# Patient Record
Sex: FEMALE | Race: WHITE | NOT HISPANIC OR LATINO | Employment: FULL TIME | URBAN - METROPOLITAN AREA
[De-identification: names, ages, dates, MRNs, and addresses within clinical notes are randomized per-mention and may not be internally consistent; named-entity substitution may affect disease eponyms.]

---

## 2019-06-27 ENCOUNTER — OFFICE VISIT (OUTPATIENT)
Dept: OBGYN CLINIC | Facility: CLINIC | Age: 54
End: 2019-06-27
Payer: COMMERCIAL

## 2019-06-27 VITALS
BODY MASS INDEX: 25.24 KG/M2 | SYSTOLIC BLOOD PRESSURE: 136 MMHG | WEIGHT: 117 LBS | HEIGHT: 57 IN | DIASTOLIC BLOOD PRESSURE: 70 MMHG

## 2019-06-27 DIAGNOSIS — Z01.419 ENCOUNTER FOR ANNUAL ROUTINE GYNECOLOGICAL EXAMINATION: ICD-10-CM

## 2019-06-27 DIAGNOSIS — Z11.51 SCREENING FOR HPV (HUMAN PAPILLOMAVIRUS): ICD-10-CM

## 2019-06-27 DIAGNOSIS — Z12.4 CERVICAL CANCER SCREENING: Primary | ICD-10-CM

## 2019-06-27 DIAGNOSIS — Z12.31 ENCOUNTER FOR SCREENING MAMMOGRAM FOR BREAST CANCER: ICD-10-CM

## 2019-06-27 PROBLEM — Z51.81 THERAPEUTIC DRUG MONITORING: Status: ACTIVE | Noted: 2017-01-02

## 2019-06-27 PROBLEM — M79.2 NEUROPATHIC PAIN: Status: ACTIVE | Noted: 2019-04-09

## 2019-06-27 PROBLEM — R25.1 TREMOR: Status: ACTIVE | Noted: 2019-04-09

## 2019-06-27 PROBLEM — R29.898 LEG WEAKNESS: Status: ACTIVE | Noted: 2017-11-18

## 2019-06-27 PROBLEM — G56.03 BILATERAL CARPAL TUNNEL SYNDROME: Status: ACTIVE | Noted: 2019-04-09

## 2019-06-27 PROCEDURE — S0612 ANNUAL GYNECOLOGICAL EXAMINA: HCPCS | Performed by: OBSTETRICS & GYNECOLOGY

## 2019-06-27 PROCEDURE — 87624 HPV HI-RISK TYP POOLED RSLT: CPT | Performed by: OBSTETRICS & GYNECOLOGY

## 2019-06-27 PROCEDURE — G0145 SCR C/V CYTO,THINLAYER,RESCR: HCPCS | Performed by: OBSTETRICS & GYNECOLOGY

## 2019-06-27 RX ORDER — AMANTADINE HYDROCHLORIDE 100 MG/1
CAPSULE, GELATIN COATED ORAL
COMMUNITY
Start: 2019-01-09

## 2019-06-27 RX ORDER — LISINOPRIL 10 MG/1
TABLET ORAL
COMMUNITY
Start: 2019-06-18

## 2019-06-27 RX ORDER — IBUPROFEN 200 MG
200 TABLET ORAL
COMMUNITY
Start: 2009-11-30

## 2019-06-27 RX ORDER — BACLOFEN 20 MG/1
20 TABLET ORAL DAILY
COMMUNITY
Start: 2019-04-09 | End: 2019-07-08

## 2019-06-27 RX ORDER — GABAPENTIN 600 MG/1
TABLET ORAL
COMMUNITY
Start: 2019-04-23

## 2019-06-27 RX ORDER — ASPIRIN 325 MG
325 TABLET ORAL DAILY
COMMUNITY
Start: 2010-12-10

## 2019-06-27 RX ORDER — PEGINTERFERON BETA-1A 125 UG/.5ML
INJECTION, SOLUTION SUBCUTANEOUS
COMMUNITY
Start: 2019-04-29

## 2019-07-02 LAB
HPV HR 12 DNA CVX QL NAA+PROBE: NEGATIVE
HPV16 DNA CVX QL NAA+PROBE: NEGATIVE
HPV18 DNA CVX QL NAA+PROBE: NEGATIVE

## 2019-07-05 LAB
LAB AP GYN PRIMARY INTERPRETATION: NORMAL
Lab: NORMAL

## 2019-07-08 ENCOUNTER — TELEPHONE (OUTPATIENT)
Dept: OBGYN CLINIC | Facility: CLINIC | Age: 54
End: 2019-07-08

## 2019-07-17 ENCOUNTER — HOSPITAL ENCOUNTER (OUTPATIENT)
Dept: RADIOLOGY | Age: 54
Discharge: HOME | End: 2019-07-17
Payer: COMMERCIAL

## 2019-07-17 VITALS — HEIGHT: 57 IN | BODY MASS INDEX: 24.81 KG/M2 | WEIGHT: 115 LBS

## 2019-07-17 DIAGNOSIS — Z12.31 ENCOUNTER FOR SCREENING MAMMOGRAM FOR BREAST CANCER: ICD-10-CM

## 2019-07-17 PROCEDURE — 77067 SCR MAMMO BI INCL CAD: CPT

## 2019-07-17 PROCEDURE — 77063 BREAST TOMOSYNTHESIS BI: CPT

## 2019-07-22 ENCOUNTER — TELEPHONE (OUTPATIENT)
Dept: OBGYN CLINIC | Facility: CLINIC | Age: 54
End: 2019-07-22

## 2019-07-22 NOTE — TELEPHONE ENCOUNTER
----- Message from [employer], DO sent at 7/18/2019 10:54 PM EDT -----  She is a candidate for breast MRI because she has dense breast tissue and an elevated risk    Sorry for the confusion

## 2020-07-20 ENCOUNTER — TELEPHONE (OUTPATIENT)
Dept: OBGYN CLINIC | Facility: CLINIC | Age: 55
End: 2020-07-20

## 2020-07-22 ENCOUNTER — HOSPITAL ENCOUNTER (OUTPATIENT)
Dept: RADIOLOGY | Age: 55
Discharge: HOME | End: 2020-07-22
Payer: COMMERCIAL

## 2020-07-22 VITALS — HEIGHT: 57 IN | WEIGHT: 114 LBS | BODY MASS INDEX: 24.59 KG/M2

## 2020-07-22 DIAGNOSIS — Z12.31 ENCOUNTER FOR SCREENING MAMMOGRAM FOR MALIGNANT NEOPLASM OF BREAST: ICD-10-CM

## 2020-07-22 PROCEDURE — 77063 BREAST TOMOSYNTHESIS BI: CPT

## 2020-07-22 PROCEDURE — 77067 SCR MAMMO BI INCL CAD: CPT

## 2020-09-24 ENCOUNTER — ANNUAL EXAM (OUTPATIENT)
Dept: OBGYN CLINIC | Facility: CLINIC | Age: 55
End: 2020-09-24
Payer: COMMERCIAL

## 2020-09-24 VITALS
DIASTOLIC BLOOD PRESSURE: 82 MMHG | TEMPERATURE: 97.6 F | HEIGHT: 56 IN | SYSTOLIC BLOOD PRESSURE: 126 MMHG | WEIGHT: 115.4 LBS | BODY MASS INDEX: 25.96 KG/M2

## 2020-09-24 DIAGNOSIS — Z01.419 ENCOUNTER FOR ANNUAL ROUTINE GYNECOLOGICAL EXAMINATION: Primary | ICD-10-CM

## 2020-09-24 DIAGNOSIS — Z12.31 ENCOUNTER FOR SCREENING MAMMOGRAM FOR BREAST CANCER: ICD-10-CM

## 2020-09-24 PROCEDURE — S0612 ANNUAL GYNECOLOGICAL EXAMINA: HCPCS | Performed by: OBSTETRICS & GYNECOLOGY

## 2020-09-24 NOTE — PROGRESS NOTES
Assessment/Plan:    pap is up to date    mammogram reviewed with her including breast density  RX given for next July   Discussed self breast exams    colon cancer screening - colonoscopy 2013, due in 2023    Discussed conservative measures for hot flashes  discussed preventive care, regular exercise and a healthy diet      No problem-specific Assessment & Plan notes found for this encounter  Diagnoses and all orders for this visit:    Encounter for annual routine gynecological examination    Encounter for screening mammogram for breast cancer  -     Mammo screening bilateral w 3d & cad; Future          Subjective:      Patient ID: Carol Bautista is a 60 y o  female  Pt here for yearly  She is having mild hot flashes  Delayed urinary emptying, possibly related to her MS, she is seeing her neurologist about this  Normal pap and negative HPV in 2019  Normal 3D mammogram in July of this year with scattered fibroglandular densities  The following portions of the patient's history were reviewed and updated as appropriate: allergies, current medications, past family history, past medical history, past social history, past surgical history and problem list     Review of Systems   Constitutional: Negative  Gastrointestinal: Negative  Endocrine: Positive for heat intolerance  Genitourinary: Negative  Objective:      /82 (BP Location: Left arm, Patient Position: Sitting, Cuff Size: Adult)   Temp 97 6 °F (36 4 °C)   Ht 4' 8" (1 422 m)   Wt 52 3 kg (115 lb 6 4 oz)   BMI 25 87 kg/m²          Physical Exam  Vitals signs reviewed  Constitutional:       Appearance: She is well-developed  Neck:      Thyroid: No thyromegaly  Trachea: No tracheal deviation  Cardiovascular:      Rate and Rhythm: Normal rate and regular rhythm  Pulmonary:      Effort: Pulmonary effort is normal       Breath sounds: Normal breath sounds     Chest:      Breasts: Breasts are symmetrical  Right: No inverted nipple, mass, nipple discharge, skin change or tenderness  Left: No inverted nipple, mass, nipple discharge, skin change or tenderness  Abdominal:      General: There is no distension  Palpations: Abdomen is soft  There is no mass  Tenderness: There is no abdominal tenderness  Genitourinary:     Labia:         Right: No rash, tenderness, lesion or injury  Left: No rash, tenderness, lesion or injury  Vagina: Normal       Cervix: No cervical motion tenderness, discharge or friability  Adnexa:         Right: No mass, tenderness or fullness  Left: No mass, tenderness or fullness          Rectum: Normal

## 2020-09-24 NOTE — PROGRESS NOTES
Mammogram - done in July 2020, some fibroglandular densities but was negative for any malignancies  Colonoscopy - up to date, last done in 2013 and will return in 2023  Pap - done in 2019  Patient is low risk  Complaints:   -mild post menopausal hot flashes- do not disrupt her normal daily living and patient's sleep is only mildly affected  -Delayed urinary emptying - started approximately 2-3 years ago  Feels need to urinate, has to sit for awhile to allow bladder to empty and then needs to repeat soon after  Has discussed this with her neuorologist who ruled out UTI - stated it could be related to her MS, next step if condition worsens is to self straight cath  Patient reports her delayed urinary emptying has not worsened at all since it has begun

## 2021-01-25 ENCOUNTER — NURSE TRIAGE (OUTPATIENT)
Dept: OTHER | Facility: OTHER | Age: 56
End: 2021-01-25

## 2021-01-25 DIAGNOSIS — Z20.828 SARS-ASSOCIATED CORONAVIRUS EXPOSURE: Primary | ICD-10-CM

## 2021-01-25 NOTE — TELEPHONE ENCOUNTER
Regarding: Covid Exposure  ----- Message from Peri Fletcher sent at 1/25/2021 12:40 PM EST -----  covid exposure from mother and works at [employer]

## 2021-01-25 NOTE — TELEPHONE ENCOUNTER
1  Were you within 6 feet or less, for up to 15 minutes or more with a person that has a confirmed COVID-19 test?   Mother tested positive  Patient assisted her for about 1 5 hours  2  What was the date of your exposure? 1/22 & 23/2021  3  Are you experiencing any symptoms attributed to the virus?  (Assess for SOB, cough, fever, difficulty breathing)    Asymptomatic  4  HIGH RISK: Do you have any history heart or lung conditions, weakened immune system, diabetes, Asthma, CHF, HIV, COPD, Chemo, renal failure, sickle cell, etc?   Multiple Sclerosis and takes Interferon  5  PREGNANCY: Are you pregnant or did you recently give birth?    N/A

## 2021-01-28 DIAGNOSIS — Z20.828 SARS-ASSOCIATED CORONAVIRUS EXPOSURE: ICD-10-CM

## 2021-01-28 PROCEDURE — 87635 SARS-COV-2 COVID-19 AMP PRB: CPT | Performed by: FAMILY MEDICINE

## 2021-01-29 LAB — SARS-COV-2 RNA RESP QL NAA+PROBE: NEGATIVE

## 2021-09-30 ENCOUNTER — ANNUAL EXAM (OUTPATIENT)
Dept: OBGYN CLINIC | Facility: CLINIC | Age: 56
End: 2021-09-30
Payer: COMMERCIAL

## 2021-09-30 VITALS
DIASTOLIC BLOOD PRESSURE: 68 MMHG | SYSTOLIC BLOOD PRESSURE: 122 MMHG | HEIGHT: 56 IN | WEIGHT: 122.6 LBS | BODY MASS INDEX: 27.58 KG/M2

## 2021-09-30 DIAGNOSIS — Z12.31 ENCOUNTER FOR SCREENING MAMMOGRAM FOR BREAST CANCER: ICD-10-CM

## 2021-09-30 DIAGNOSIS — Z01.419 ENCOUNTER FOR ANNUAL ROUTINE GYNECOLOGICAL EXAMINATION: Primary | ICD-10-CM

## 2021-09-30 PROCEDURE — 99396 PREV VISIT EST AGE 40-64: CPT | Performed by: OBSTETRICS & GYNECOLOGY

## 2021-09-30 NOTE — PROGRESS NOTES
Assessment/Plan:    pap is up to date    mammogram reviewed with her including breast density  Briefly discuss automated breast ultrasound however she is not dense and she is not interested at this time  This is scheduled  Discussed self breast exams      Overactive bladder - this is most likely aggravated by her MS  We discussed over the counter Oxytrol patch, Detrol or VESIcare or even Myrbetriq  We discussed bladder training, avoiding bladder irritants and pelvic floor PT  She is going to discuss the medications with her neurologist     colon cancer screening - colonoscopy done in 2013  Recall in 10 years    discussed preventive care, regular exercise and a healthy diet    No problem-specific Assessment & Plan notes found for this encounter  Diagnoses and all orders for this visit:    Encounter for annual routine gynecological examination    Encounter for screening mammogram for breast cancer    Other orders  -     Ascorbic Acid, Vitamin C, (VITAMIN C) 100 MG tablet; Take 1,000 mg by mouth daily          Subjective:      Patient ID: Christie Hopper is a 60 y o  female  Patient here for yearly  She has no Gyn complaints  She was having frequent urination and nocturia  She was placed on Ditropan by Neurology but could not tolerate the dry mouth  Normal Pap and negative HPV in 2019  Normal 3D mammogram last July showed scattered fibroglandular densities and intermediate risk  This is scheduled for 10-28  Her mother had breast cancer  The following portions of the patient's history were reviewed and updated as appropriate: allergies, current medications, past family history, past medical history, past social history, past surgical history and problem list     Review of Systems   Constitutional: Negative  Gastrointestinal: Negative  Genitourinary: Negative            Objective:      /68 (BP Location: Left arm, Patient Position: Sitting, Cuff Size: Standard)   Ht 4' 8" (1 422 m)   Wt 55 6 kg (122 lb 9 6 oz)   BMI 27 49 kg/m²          Physical Exam  Vitals reviewed  Constitutional:       Appearance: She is well-developed  Neck:      Thyroid: No thyromegaly  Trachea: No tracheal deviation  Cardiovascular:      Rate and Rhythm: Normal rate and regular rhythm  Pulmonary:      Effort: Pulmonary effort is normal       Breath sounds: Normal breath sounds  Chest:      Breasts: Breasts are symmetrical          Right: No inverted nipple, mass, nipple discharge, skin change or tenderness  Left: No inverted nipple, mass, nipple discharge, skin change or tenderness  Abdominal:      General: There is no distension  Palpations: Abdomen is soft  There is no mass  Tenderness: There is no abdominal tenderness  Genitourinary:     Labia:         Right: No rash, tenderness, lesion or injury  Left: No rash, tenderness, lesion or injury  Vagina: Normal       Cervix: No cervical motion tenderness, discharge or friability  Adnexa:         Right: No mass, tenderness or fullness  Left: No mass, tenderness or fullness          Rectum: Normal

## 2021-10-28 ENCOUNTER — HOSPITAL ENCOUNTER (OUTPATIENT)
Dept: RADIOLOGY | Age: 56
Discharge: HOME | End: 2021-10-28
Payer: COMMERCIAL

## 2021-10-28 VITALS — BODY MASS INDEX: 25.46 KG/M2 | WEIGHT: 118 LBS | HEIGHT: 57 IN

## 2021-10-28 DIAGNOSIS — Z12.31 ENCOUNTER FOR SCREENING MAMMOGRAM FOR BREAST CANCER: ICD-10-CM

## 2021-10-28 PROCEDURE — 77067 SCR MAMMO BI INCL CAD: CPT

## 2021-10-28 PROCEDURE — 77063 BREAST TOMOSYNTHESIS BI: CPT

## 2022-08-25 ENCOUNTER — HOSPITAL ENCOUNTER (EMERGENCY)
Facility: HOSPITAL | Age: 57
Discharge: HOME | End: 2022-08-25
Attending: EMERGENCY MEDICINE
Payer: COMMERCIAL

## 2022-08-25 ENCOUNTER — APPOINTMENT (EMERGENCY)
Dept: RADIOLOGY | Facility: HOSPITAL | Age: 57
End: 2022-08-25
Payer: COMMERCIAL

## 2022-08-25 VITALS
HEART RATE: 85 BPM | TEMPERATURE: 98.5 F | SYSTOLIC BLOOD PRESSURE: 134 MMHG | RESPIRATION RATE: 18 BRPM | DIASTOLIC BLOOD PRESSURE: 81 MMHG | OXYGEN SATURATION: 99 %

## 2022-08-25 DIAGNOSIS — R51.9 HEADACHE: Primary | ICD-10-CM

## 2022-08-25 PROCEDURE — G1004 CDSM NDSC: HCPCS

## 2022-08-25 PROCEDURE — 99284 EMERGENCY DEPT VISIT MOD MDM: CPT | Performed by: EMERGENCY MEDICINE

## 2022-08-25 PROCEDURE — 99284 EMERGENCY DEPT VISIT MOD MDM: CPT

## 2022-08-25 PROCEDURE — 70450 CT HEAD/BRAIN W/O DYE: CPT

## 2022-08-25 PROCEDURE — 96374 THER/PROPH/DIAG INJ IV PUSH: CPT

## 2022-08-25 PROCEDURE — 96361 HYDRATE IV INFUSION ADD-ON: CPT

## 2022-08-25 RX ORDER — KETOROLAC TROMETHAMINE 30 MG/ML
15 INJECTION, SOLUTION INTRAMUSCULAR; INTRAVENOUS ONCE
Status: COMPLETED | OUTPATIENT
Start: 2022-08-25 | End: 2022-08-25

## 2022-08-25 RX ADMIN — SODIUM CHLORIDE 500 ML: 0.9 INJECTION, SOLUTION INTRAVENOUS at 21:01

## 2022-08-25 RX ADMIN — KETOROLAC TROMETHAMINE 15 MG: 30 INJECTION, SOLUTION INTRAMUSCULAR at 21:01

## 2022-08-26 NOTE — ED ATTENDING ATTESTATION
8/25/2022  INicole DO, saw and evaluated the patient  I have discussed the patient with the resident/non-physician practitioner and agree with the resident's/non-physician practitioner's findings, Plan of Care, and MDM as documented in the resident's/non-physician practitioner's note, except where noted  All available labs and Radiology studies were reviewed  I was present for key portions of any procedure(s) performed by the resident/non-physician practitioner and I was immediately available to provide assistance  At this point I agree with the current assessment done in the Emergency Department  I have conducted an independent evaluation of this patient a history and physical is as follows:      57-year-old female presents for headache  History of MS  Was recently tested positive for COVID  Started on paxlovid  Concerned because she had a severe headache today that woke her from sleep  It was approximately 6-8 hours ago  Initially she had said worse headache of her life but she states that she overall just feels unwell  She describes headache as gradual   On exam the patient appears comfortable she has normal neuro exam   Plan is CT head to rule out subarachnoid    Supportive care with pain control discharge if CT normal      ED Course         Critical Care Time  Procedures

## 2022-08-26 NOTE — ED PROVIDER NOTES
History  Chief Complaint   Patient presents with    Headache     Tested + for COVID  Having a headache, chest pain with cough, and generally not feeling well  History of MS  Patient is a 59year old female with a PMH of MS here for headache  She had a mild cough that was diagnosed with covid this morning and started on paxlovid  She was resting on her couch when a sudden onset headache woke her up from sleep  She described it as immediate in onset and waking her from sleep, the worst headache of her life  Patient additionally had a couple episodes of vomiting, which she attributed to her headache rather than coughing fits  Patient has had fevers at home, a cough, generally feels unwell without any specific neurological deficits  Prior to Admission Medications   Prescriptions Last Dose Informant Patient Reported? Taking?    Ascorbic Acid, Vitamin C, (VITAMIN C) 100 MG tablet   Yes No   Sig: Take 1,000 mg by mouth daily   Cetirizine HCl 10 MG CAPS   Yes No   Sig: Take 10 mg by mouth   Cholecalciferol 4000 units CAPS   Yes No   Sig: Take 2,000 Units by mouth daily   Multiple Vitamins-Minerals (MULTIVITAMIN ADULT PO)   Yes No   Sig: Once daily   OMEGA-3 FATTY ACIDS PO   Yes No   Sig: Take 1 g by mouth daily   PLEGRIDY 125 MCG/0 5ML SOPN   Yes No   amantadine (SYMMETREL) 100 mg capsule   Yes No   Sig: TAKE 1 CAPSULE TWICE A DAY FOR FATIGUE DUE TO MULTIPLE SCLEROSIS   aspirin 325 mg tablet   Yes No   Sig: Take 325 mg by mouth daily   baclofen 20 mg tablet   Yes No   Sig: Take 20 mg by mouth daily   gabapentin (NEURONTIN) 600 MG tablet   Yes No   ibuprofen (MOTRIN) 200 mg tablet   Yes No   Sig: Take 200 mg by mouth   lisinopril (ZESTRIL) 10 mg tablet   Yes No      Facility-Administered Medications: None       Past Medical History:   Diagnosis Date    Hypertension     MS (multiple sclerosis) ([facility] [address] )        Past Surgical History:   Procedure Laterality Date     SECTION      DILATION AND EVACUATION      ENDOMETRIAL ABLATION      SHOULDER SURGERY Left        Family History   Problem Relation Age of Onset    Breast cancer Mother 79    Hypertension Mother     Lymphoma Father     No Known Problems Brother     No Known Problems Maternal Aunt     No Known Problems Paternal Aunt     No Known Problems Maternal Grandmother     No Known Problems Maternal Grandfather     No Known Problems Paternal Grandmother     No Known Problems Paternal Grandfather      I have reviewed and agree with the history as documented  E-Cigarette/Vaping    E-Cigarette Use Never User      E-Cigarette/Vaping Substances    Nicotine No     THC No     CBD No     Flavoring No     Other No     Unknown No      Social History     Tobacco Use    Smoking status: Former Smoker     Types: Cigarettes    Smokeless tobacco: Never Used   Vaping Use    Vaping Use: Never used   Substance Use Topics    Alcohol use: Yes    Drug use: Never        Review of Systems   Constitutional: Positive for fatigue and fever  Negative for chills  HENT: Negative for ear pain, hearing loss, tinnitus and trouble swallowing  Eyes: Negative for discharge and visual disturbance  Respiratory: Positive for cough  Negative for shortness of breath  Cardiovascular: Negative for chest pain and palpitations  Gastrointestinal: Positive for nausea and vomiting  Negative for abdominal pain, constipation and diarrhea  Genitourinary: Negative for dysuria and hematuria  Musculoskeletal: Negative for arthralgias and back pain  Skin: Negative for color change and rash  Neurological: Negative for seizures and syncope  All other systems reviewed and are negative        Physical Exam  ED Triage Vitals [08/25/22 1751]   Temperature Pulse Respirations Blood Pressure SpO2   98 5 °F (36 9 °C) 85 18 134/81 99 %      Temp Source Heart Rate Source Patient Position - Orthostatic VS BP Location FiO2 (%)   Oral -- -- -- --      Pain Score       10 - Worst Possible Pain             Orthostatic Vital Signs  Vitals:    08/25/22 1751   BP: 134/81   Pulse: 85       Physical Exam  Vitals and nursing note reviewed  Constitutional:       General: She is not in acute distress  Appearance: She is well-developed  HENT:      Head: Normocephalic and atraumatic  Eyes:      Conjunctiva/sclera: Conjunctivae normal    Cardiovascular:      Rate and Rhythm: Normal rate and regular rhythm  Heart sounds: No murmur heard  Pulmonary:      Effort: Pulmonary effort is normal  No respiratory distress  Breath sounds: Normal breath sounds  Abdominal:      Palpations: Abdomen is soft  Tenderness: There is no abdominal tenderness  Musculoskeletal:      Cervical back: Neck supple  No rigidity or tenderness  Skin:     General: Skin is warm and dry  Neurological:      General: No focal deficit present  Mental Status: She is alert and oriented to person, place, and time  Mental status is at baseline  Cranial Nerves: No cranial nerve deficit  Sensory: No sensory deficit  Motor: No weakness  Coordination: Coordination normal    Psychiatric:         Mood and Affect: Mood normal          ED Medications  Medications   sodium chloride 0 9 % bolus 500 mL (500 mL Intravenous New Bag 8/25/22 2101)   ketorolac (TORADOL) injection 15 mg (15 mg Intravenous Given 8/25/22 2101)       Diagnostic Studies  Results Reviewed     None                 CT head without contrast   Final Result by John Aranda MD (08/25 2055)      No acute intracranial abnormality  Mild chronic small vessel ischemic changes  Workstation performed: [id number]               Procedures  Procedures      ED Course                                       MDM  Number of Diagnoses or Management Options  Diagnosis management comments: Patient's headache could be from covid; we will treat her symptomatically  However, her history has many concerning signs for SAH   We will CT scan her head to rule this out  I reviewed all testing with the patient:  CT was normal and showed no acute intracranial process  I gave oral return precautions for what to return for in addition to the written return precautions  The patient verbalized understanding of the discharge instructions and warnings that would necessitate return to the Emergency Department  I specifically highlighted areas of special concern regarding the written and verbal discharge instructions and return precautions  All questions were answered prior to discharge  Disposition  Final diagnoses:   None     ED Disposition     None      Follow-up Information    None         Patient's Medications   Discharge Prescriptions    No medications on file     No discharge procedures on file  PDMP Review     None           ED Provider  Attending physically available and evaluated Yobani Navarrete  I managed the patient along with the ED Attending      Electronically Signed by         Troy Cabral MD  08/25/22 2834

## 2022-08-26 NOTE — DISCHARGE INSTRUCTIONS
You have been evaluated in the Emergency Department today for headache  Your evaluation, including CT of the head, suggests that your symptoms are due to the recent COVID infection  Please follow up with your primary care physician if your symptoms do not improve    Return to the Emergency Department if you experience dramatic worsening in your symptoms  Thank you for choosing us for your care

## 2022-10-06 ENCOUNTER — ANNUAL EXAM (OUTPATIENT)
Dept: GYNECOLOGY | Facility: CLINIC | Age: 57
End: 2022-10-06
Payer: COMMERCIAL

## 2022-10-06 VITALS
SYSTOLIC BLOOD PRESSURE: 130 MMHG | DIASTOLIC BLOOD PRESSURE: 84 MMHG | HEIGHT: 57 IN | BODY MASS INDEX: 27.66 KG/M2 | WEIGHT: 128.2 LBS

## 2022-10-06 DIAGNOSIS — Z12.31 ENCOUNTER FOR SCREENING MAMMOGRAM FOR BREAST CANCER: ICD-10-CM

## 2022-10-06 DIAGNOSIS — R92.2 DENSE BREAST TISSUE ON MAMMOGRAM: ICD-10-CM

## 2022-10-06 DIAGNOSIS — Z01.419 ENCOUNTER FOR ANNUAL ROUTINE GYNECOLOGICAL EXAMINATION: Primary | ICD-10-CM

## 2022-10-06 PROCEDURE — S0612 ANNUAL GYNECOLOGICAL EXAMINA: HCPCS | Performed by: OBSTETRICS & GYNECOLOGY

## 2022-10-06 RX ORDER — ESCITALOPRAM OXALATE 10 MG/1
TABLET ORAL
COMMUNITY
Start: 2022-09-14

## 2022-10-06 NOTE — PROGRESS NOTES
Assessment/Plan:    pap is up to date    mammogram reviewed with her including breast density  RX given so she can schedule  Discussed elevated risk and dense tissue along with additional screening  Discussed MRI and ABUS  she would like to have ABUS  This was ordered and she will check on coverage  Discussed self breast exams    colon cancer screening - colonoscopy due next year, it was done in 2013    discussed preventive care, regular exercise and a healthy diet      No problem-specific Assessment & Plan notes found for this encounter  Diagnoses and all orders for this visit:    Encounter for annual routine gynecological examination    Encounter for screening mammogram for breast cancer  -     Mammo screening bilateral w 3d & cad; Future    Dense breast tissue on mammogram  -     US breast screening bilateral complete (ABUS); Future    Other orders  -     escitalopram (LEXAPRO) 10 mg tablet          Subjective:      Patient ID: Devon Latham is a 59 y o  female  Pt here for yearly  She was started on Lexapro  She is taking care of her mother and had been taking care of her mother in law who passed away this year  She has no gyn complaints  MS is under control  Normal pap, negative HPV in 2019  Normal 3D mammogram last October with dense tissue and elevated risk  When called about additional screening, she was undecided  The following portions of the patient's history were reviewed and updated as appropriate: allergies, current medications, past family history, past medical history, past social history, past surgical history and problem list     Review of Systems   Constitutional: Negative  Gastrointestinal: Negative  Genitourinary: Negative  Objective: There were no vitals taken for this visit  Physical Exam  Vitals reviewed  Constitutional:       Appearance: She is well-developed  Neck:      Thyroid: No thyromegaly  Trachea: No tracheal deviation  Cardiovascular:      Rate and Rhythm: Normal rate and regular rhythm  Pulmonary:      Effort: Pulmonary effort is normal       Breath sounds: Normal breath sounds  Chest:   Breasts: Breasts are symmetrical       Right: No inverted nipple, mass, nipple discharge, skin change or tenderness  Left: No inverted nipple, mass, nipple discharge, skin change or tenderness  Abdominal:      General: There is no distension  Palpations: Abdomen is soft  There is no mass  Tenderness: There is no abdominal tenderness  Genitourinary:     Labia:         Right: No rash, tenderness, lesion or injury  Left: No rash, tenderness, lesion or injury  Vagina: Normal       Cervix: No cervical motion tenderness, discharge or friability  Adnexa:         Right: No mass, tenderness or fullness  Left: No mass, tenderness or fullness          Rectum: Normal

## 2022-12-15 ENCOUNTER — HOSPITAL ENCOUNTER (OUTPATIENT)
Dept: ULTRASOUND IMAGING | Facility: CLINIC | Age: 57
Discharge: HOME | End: 2022-12-15

## 2022-12-15 ENCOUNTER — HOSPITAL ENCOUNTER (OUTPATIENT)
Dept: MAMMOGRAPHY | Facility: CLINIC | Age: 57
Discharge: HOME | End: 2022-12-15

## 2022-12-15 VITALS — HEIGHT: 57 IN | WEIGHT: 128 LBS | BODY MASS INDEX: 27.61 KG/M2

## 2022-12-15 DIAGNOSIS — R92.2 DENSE BREAST TISSUE ON MAMMOGRAM: ICD-10-CM

## 2022-12-15 DIAGNOSIS — Z12.31 ENCOUNTER FOR SCREENING MAMMOGRAM FOR BREAST CANCER: ICD-10-CM

## 2023-07-09 ENCOUNTER — OFFICE VISIT (OUTPATIENT)
Dept: URGENT CARE | Age: 58
End: 2023-07-09
Payer: COMMERCIAL

## 2023-07-09 VITALS
HEART RATE: 63 BPM | SYSTOLIC BLOOD PRESSURE: 150 MMHG | DIASTOLIC BLOOD PRESSURE: 68 MMHG | TEMPERATURE: 97.3 F | RESPIRATION RATE: 18 BRPM | OXYGEN SATURATION: 96 %

## 2023-07-09 DIAGNOSIS — R39.9 UTI SYMPTOMS: Primary | ICD-10-CM

## 2023-07-09 PROCEDURE — S9083 URGENT CARE CENTER GLOBAL: HCPCS | Performed by: PHYSICIAN ASSISTANT

## 2023-07-09 PROCEDURE — G0382 LEV 3 HOSP TYPE B ED VISIT: HCPCS | Performed by: PHYSICIAN ASSISTANT

## 2023-07-09 RX ORDER — CEPHALEXIN 500 MG/1
500 CAPSULE ORAL EVERY 12 HOURS SCHEDULED
Qty: 14 CAPSULE | Refills: 0 | Status: SHIPPED | OUTPATIENT
Start: 2023-07-09 | End: 2023-07-16

## 2023-07-09 NOTE — PATIENT INSTRUCTIONS
Continue to monitor symptoms. If new or worsening symptoms develop, go immediately to Er. Drink plenty of fluids. Follow up with Family Doctor this week. Urinary Tract Infection in Women   WHAT YOU NEED TO KNOW:   A urinary tract infection (UTI) is caused by bacteria that get inside your urinary tract. Your urinary tract includes your kidneys, ureters, bladder, and urethra. A UTI is more common in your lower urinary tract, which includes your bladder and urethra. DISCHARGE INSTRUCTIONS:   Return to the emergency department if:   You are urinating very little or not at all. You have a high fever with shaking chills. You have side or back pain that gets worse. Call your doctor if:   You have a fever. You do not feel better after 2 days of taking antibiotics. You have new symptoms, such as blood or pus in your urine. You are vomiting. You have questions or concerns about your condition or care. Medicines:   Antibiotics  treat a bacterial infection. If you have UTIs often (called recurrent UTIs), you may be given antibiotics to take regularly. You will be given directions for when and how to use antibiotics. The goal is to prevent UTIs but not cause antibiotic resistance by using antibiotics too often. Medicines  may be given to decrease pain and burning when you urinate. They will also help decrease the feeling that you need to urinate often. These medicines may make your urine orange or red. Take your medicine as directed. Contact your healthcare provider if you think your medicine is not helping or if you have side effects. Tell your provider if you are allergic to any medicine. Keep a list of the medicines, vitamins, and herbs you take. Include the amounts, and when and why you take them. Bring the list or the pill bottles to follow-up visits. Carry your medicine list with you in case of an emergency.     Follow up with your doctor as directed:  Write down your questions so you remember to ask them during your visits. Prevent another UTI:   Empty your bladder often. Urinate and empty your bladder as soon as you feel the need. Do not hold your urine for long periods of time. Wipe from front to back after you urinate or have a bowel movement. This will help prevent germs from getting into your urinary tract through your urethra. Drink liquids as directed. Ask how much liquid to drink each day and which liquids are best for you. You may need to drink more liquids than usual to help flush out the bacteria. Do not drink alcohol, caffeine, or citrus juices. These can irritate your bladder and increase your symptoms. Your healthcare provider may recommend cranberry juice to help prevent a UTI. Urinate before and after you have sex. This can help flush out bacteria passed during sex. Do not douche or use feminine deodorants. These can change the chemical balance in your vagina. Change sanitary pads or tampons often. This will help prevent germs from getting into your urinary tract. Talk to your healthcare provider about your birth control method. You may need to change your method if it is increasing your risk for UTIs. Wear cotton underwear and clothes that are loose. Tight pants and nylon underwear can trap moisture and cause bacteria to grow. Vaginal estrogen may be recommended. This medicine helps prevent UTIs in women who have gone through menopause or are in daniel-menopause. Do pelvic muscle exercises often. Pelvic muscle exercises may help you start and stop urinating. Strong pelvic muscles may help you empty your bladder easier. Squeeze these muscles tightly for 5 seconds like you are trying to hold back urine. Then relax for 5 seconds. Gradually work up to squeezing for 10 seconds. Do 3 sets of 15 repetitions a day, or as directed.     © Copyright [employer] 2022 Information is for End User's use only and may not be sold, redistributed or otherwise used for commercial purposes. The above information is an  only. It is not intended as medical advice for individual conditions or treatments. Talk to your doctor, nurse or pharmacist before following any medical regimen to see if it is safe and effective for you.

## 2023-07-09 NOTE — PROGRESS NOTES
[facility]        NAME: James Gentile is a 59 y.o. female  : 1965    MRN: [id number]  DATE: 2023  TIME: 4:19 PM    Assessment and Plan   UTI symptoms [R39.9]  1. UTI symptoms  cephalexin (KEFLEX) 500 mg capsule    CANCELED: Urine culture    CANCELED: POCT urine dip        Pt unable to void    Patient Instructions     Continue to monitor symptoms. If new or worsening symptoms develop, go immediately to Er. Drink plenty of fluids. Follow up with Family Doctor this week. Chief Complaint     Chief Complaint   Patient presents with   • Possible UTI     Patient states that starting this morning she has difficulty starting a stream but increased frequency, and burning when voiding. History of Present Illness       Difficulty Urinating   This is a new problem. The current episode started today. The problem occurs every urination. The problem has been gradually worsening. The quality of the pain is described as burning. The pain is moderate. There has been no fever. Associated symptoms include frequency and urgency. Pertinent negatives include no chills, discharge, flank pain, hematuria, hesitancy, nausea or vomiting. She has tried nothing for the symptoms. The treatment provided no relief. Her past medical history is significant for recurrent UTIs. Review of Systems   Review of Systems   Constitutional: Negative. Negative for chills, fatigue and fever. HENT: Negative. Eyes: Negative. Respiratory: Negative. Cardiovascular: Negative. Gastrointestinal: Negative for abdominal pain, constipation, diarrhea, nausea and vomiting. Endocrine: Negative. Genitourinary: Positive for dysuria, frequency and urgency. Negative for flank pain, hematuria, hesitancy, pelvic pain, vaginal bleeding, vaginal discharge and vaginal pain. Musculoskeletal: Negative for back pain, gait problem, neck pain and neck stiffness. Skin: Negative. Negative for pallor and rash. Allergic/Immunologic: Negative. Neurological: Negative. Negative for weakness and numbness. Hematological: Negative. Psychiatric/Behavioral: Negative.           Current Medications       Current Outpatient Medications:   •  amantadine (SYMMETREL) 100 mg capsule, TAKE 1 CAPSULE TWICE A DAY FOR FATIGUE DUE TO MULTIPLE SCLEROSIS, Disp: , Rfl:   •  Ascorbic Acid, Vitamin C, (VITAMIN C) 100 MG tablet, Take 1,000 mg by mouth daily, Disp: , Rfl:   •  aspirin 325 mg tablet, Take 325 mg by mouth daily, Disp: , Rfl:   •  cephalexin (KEFLEX) 500 mg capsule, Take 1 capsule (500 mg total) by mouth every 12 (twelve) hours for 7 days, Disp: 14 capsule, Rfl: 0  •  Cetirizine HCl 10 MG CAPS, Take 10 mg by mouth, Disp: , Rfl:   •  Cholecalciferol 4000 units CAPS, Take 2,000 Units by mouth daily, Disp: , Rfl:   •  escitalopram (LEXAPRO) 10 mg tablet, , Disp: , Rfl:   •  gabapentin (NEURONTIN) 600 MG tablet, , Disp: , Rfl:   •  ibuprofen (MOTRIN) 200 mg tablet, Take 200 mg by mouth, Disp: , Rfl:   •  lisinopril (ZESTRIL) 10 mg tablet, , Disp: , Rfl:   •  Multiple Vitamins-Minerals (MULTIVITAMIN ADULT PO), Once daily, Disp: , Rfl:   •  OMEGA-3 FATTY ACIDS PO, Take 1 g by mouth daily, Disp: , Rfl:   •  PLEGRIDY 125 MCG/0.5ML SOPN, , Disp: , Rfl:   •  baclofen 20 mg tablet, Take 20 mg by mouth daily, Disp: , Rfl:     Current Allergies     Allergies as of 2023 - Reviewed 2023   Allergen Reaction Noted   • Prochlorperazine  2006            The following portions of the patient's history were reviewed and updated as appropriate: allergies, current medications, past family history, past medical history, past social history, past surgical history and problem list.     Past Medical History:   Diagnosis Date   • Hypertension    • MS (multiple sclerosis) ([address])        Past Surgical History:   Procedure Laterality Date   •  SECTION     • DILATION AND EVACUATION     • ENDOMETRIAL ABLATION     • SHOULDER SURGERY Left        Family History   Problem Relation Age of Onset   • Breast cancer Mother 79   • Hypertension Mother    • Lymphoma Father    • No Known Problems Brother    • No Known Problems Maternal Grandmother    • No Known Problems Maternal Grandfather    • No Known Problems Paternal Grandmother    • No Known Problems Paternal Grandfather    • No Known Problems Maternal Aunt    • No Known Problems Paternal Aunt          Medications have been verified. Objective   /68   Pulse 63   Temp (!) 97.3 °F (36.3 °C)   Resp 18   SpO2 96%        Physical Exam     Physical Exam  Vitals and nursing note reviewed. Constitutional:       General: She is not in acute distress. Appearance: Normal appearance. She is well-developed. She is not ill-appearing or diaphoretic. HENT:      Head: Normocephalic and atraumatic. Cardiovascular:      Rate and Rhythm: Normal rate and regular rhythm. Heart sounds: Normal heart sounds. Pulmonary:      Effort: Pulmonary effort is normal. No respiratory distress. Breath sounds: Normal breath sounds. No wheezing or rales. Abdominal:      General: Bowel sounds are normal. There is no distension. Palpations: Abdomen is soft. Tenderness: There is no abdominal tenderness. There is no right CVA tenderness, left CVA tenderness or guarding. Skin:     General: Skin is warm. Coloration: Skin is not pale. Findings: No rash. Neurological:      Mental Status: She is alert.

## 2023-10-24 NOTE — PROGRESS NOTES
Assessment/Plan:    pap and HPV done today    mammogram reviewed with her including breast density. RX given for December. Will await results and then determine additional screening. She may choose not to do this. Discussed self breast exams    colon cancer screening - colonoscopy done this summer, recall in 10 years    discussed preventive care, regular exercise and a healthy diet      No problem-specific Assessment & Plan notes found for this encounter. Diagnoses and all orders for this visit:    Encounter for annual routine gynecological examination  -     Liquid-based pap, screening    Encounter for screening mammogram for breast cancer  -     Mammo screening bilateral w 3d & cad; Future    Screening for HPV (human papillomavirus)  -     Liquid-based pap, screening    Other orders  -     dicyclomine (BENTYL) 10 mg capsule; Take 10 mg by mouth  -     Linzess 145 MCG CAPS  -     Paxlovid, 300/100, tablet therapy pack; TAKE BY ORAL ROUTE 2 TIMES EVERY DAY FOR 5 DAYS PER PACKAGE DIRECTIONS          Subjective:      Patient ID: John Bland is a 59 y.o. female. Patient here for yearly. MS is well controlled, her medication was discomtinued. She has no gyn complaints. She retired. Normal Pap and negative HPV in 2019  Normal 3D mammogram last December with dense tissue and elevated risk. Last year, we discussed additional screening and patient preferred ABUS. She also had a normal ABUS in December. She would just like a mammogram this year. Her mother had breast cancer at 67. She is now 80! The following portions of the patient's history were reviewed and updated as appropriate: allergies, current medications, past family history, past medical history, past social history, past surgical history, and problem list.    Review of Systems   Constitutional: Negative. Gastrointestinal: Negative. Genitourinary: Negative. Objective:       There were no vitals taken for this visit.         Physical Exam  Vitals reviewed. Constitutional:       Appearance: Normal appearance. She is well-developed. Neck:      Thyroid: No thyromegaly. Trachea: No tracheal deviation. Cardiovascular:      Rate and Rhythm: Normal rate and regular rhythm. Pulmonary:      Effort: Pulmonary effort is normal.      Breath sounds: Normal breath sounds. Chest:   Breasts:     Breasts are symmetrical.      Right: No inverted nipple, mass, nipple discharge, skin change or tenderness. Left: No inverted nipple, mass, nipple discharge, skin change or tenderness. Abdominal:      General: There is no distension. Palpations: Abdomen is soft. There is no mass. Tenderness: There is no abdominal tenderness. Genitourinary:     Labia:         Right: No rash, tenderness, lesion or injury. Left: No rash, tenderness, lesion or injury. Vagina: Normal.      Cervix: No cervical motion tenderness, discharge or friability. Adnexa:         Right: No mass, tenderness or fullness. Left: No mass, tenderness or fullness. Rectum: Normal.   Neurological:      Mental Status: She is alert.

## 2023-10-26 ENCOUNTER — ANNUAL EXAM (OUTPATIENT)
Dept: GYNECOLOGY | Facility: CLINIC | Age: 58
End: 2023-10-26
Payer: COMMERCIAL

## 2023-10-26 VITALS
HEIGHT: 57 IN | SYSTOLIC BLOOD PRESSURE: 152 MMHG | WEIGHT: 127.8 LBS | DIASTOLIC BLOOD PRESSURE: 88 MMHG | BODY MASS INDEX: 27.57 KG/M2

## 2023-10-26 DIAGNOSIS — Z12.31 ENCOUNTER FOR SCREENING MAMMOGRAM FOR BREAST CANCER: ICD-10-CM

## 2023-10-26 DIAGNOSIS — Z01.419 ENCOUNTER FOR ANNUAL ROUTINE GYNECOLOGICAL EXAMINATION: Primary | ICD-10-CM

## 2023-10-26 DIAGNOSIS — Z11.51 SCREENING FOR HPV (HUMAN PAPILLOMAVIRUS): ICD-10-CM

## 2023-10-26 PROCEDURE — S0612 ANNUAL GYNECOLOGICAL EXAMINA: HCPCS | Performed by: OBSTETRICS & GYNECOLOGY

## 2023-10-26 PROCEDURE — G0145 SCR C/V CYTO,THINLAYER,RESCR: HCPCS | Performed by: OBSTETRICS & GYNECOLOGY

## 2023-10-26 PROCEDURE — G0476 HPV COMBO ASSAY CA SCREEN: HCPCS | Performed by: OBSTETRICS & GYNECOLOGY

## 2023-10-26 RX ORDER — LINACLOTIDE 145 UG/1
CAPSULE, GELATIN COATED ORAL
COMMUNITY
Start: 2023-08-29

## 2023-10-26 RX ORDER — DICYCLOMINE HYDROCHLORIDE 10 MG/1
10 CAPSULE ORAL
COMMUNITY
Start: 2023-08-17

## 2023-10-26 RX ORDER — NIRMATRELVIR AND RITONAVIR 300-100 MG
KIT ORAL
COMMUNITY
Start: 2023-10-10

## 2023-10-31 LAB
LAB AP GYN PRIMARY INTERPRETATION: NORMAL
Lab: NORMAL

## 2024-01-19 DIAGNOSIS — Z00.6 ENCOUNTER FOR EXAMINATION FOR NORMAL COMPARISON OR CONTROL IN CLINICAL RESEARCH PROGRAM: ICD-10-CM

## 2024-01-30 ENCOUNTER — APPOINTMENT (OUTPATIENT)
Dept: LAB | Age: 59
End: 2024-01-30

## 2024-01-30 DIAGNOSIS — Z00.6 ENCOUNTER FOR EXAMINATION FOR NORMAL COMPARISON OR CONTROL IN CLINICAL RESEARCH PROGRAM: ICD-10-CM

## 2024-01-30 PROCEDURE — 36415 COLL VENOUS BLD VENIPUNCTURE: CPT

## 2024-02-22 ENCOUNTER — HOSPITAL ENCOUNTER (OUTPATIENT)
Dept: RADIOLOGY | Age: 59
Discharge: HOME | End: 2024-02-22
Payer: COMMERCIAL

## 2024-02-22 DIAGNOSIS — Z12.31 ENCOUNTER FOR SCREENING MAMMOGRAM FOR BREAST CANCER: ICD-10-CM

## 2024-02-22 PROCEDURE — 77067 SCR MAMMO BI INCL CAD: CPT

## 2024-02-22 PROCEDURE — 77063 BREAST TOMOSYNTHESIS BI: CPT

## 2024-02-25 LAB
APOB+LDLR+PCSK9 GENE MUT ANL BLD/T: NOT DETECTED
BRCA1+BRCA2 DEL+DUP + FULL MUT ANL BLD/T: NOT DETECTED
MLH1+MSH2+MSH6+PMS2 GN DEL+DUP+FUL M: NOT DETECTED

## 2024-05-31 ENCOUNTER — OFFICE VISIT (OUTPATIENT)
Dept: URGENT CARE | Age: 59
End: 2024-05-31
Payer: COMMERCIAL

## 2024-05-31 VITALS
OXYGEN SATURATION: 100 % | HEART RATE: 85 BPM | WEIGHT: 128 LBS | BODY MASS INDEX: 28.79 KG/M2 | SYSTOLIC BLOOD PRESSURE: 170 MMHG | TEMPERATURE: 98.7 F | RESPIRATION RATE: 18 BRPM | HEIGHT: 56 IN | DIASTOLIC BLOOD PRESSURE: 87 MMHG
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
## 2024-05-31 PROCEDURE — 99213 OFFICE O/P EST LOW 20 MIN: CPT | Performed by: FAMILY MEDICINE

## 2024-05-31 RX ORDER — AMOXICILLIN AND CLAVULANATE POTASSIUM 875; 125 MG/1; MG/1
1 TABLET, FILM COATED ORAL EVERY 12 HOURS SCHEDULED
Qty: 20 TABLET | Refills: 0 | Status: SHIPPED | OUTPATIENT
Start: 2024-05-31 | End: 2024-06-10

## 2024-05-31 NOTE — ASSESSMENT & PLAN NOTE
Dog Bite on Hand     PLAN:   - Wound irrigated with normal saline and cleaned with beta iodine   - Wounds bandaged   - Can use Vaseline otherwise no additional dressing  - Augmentin x 10 days

## 2024-05-31 NOTE — PROGRESS NOTES
"Ambulatory Visit  Name: Adriana Singh      : 1965      MRN: [id number]  Encounter Provider: Mark Anthony Hernandez DO  Encounter Date: 2024   Encounter department: [facility]    Assessment & Plan   1. Dog bite, initial encounter  Assessment & Plan:  Dog Bite on Hand     PLAN:   - Wound irrigated with normal saline and cleaned with beta iodine   - Wounds bandaged   - Can use Vaseline otherwise no additional dressing  - Augmentin x 10 days  Orders:  -     amoxicillin-clavulanate (AUGMENTIN) 875-125 mg per tablet; Take 1 tablet by mouth every 12 (twelve) hours for 10 days      History of Present Illness     Adriana Singh is a 59 y.o. female who presents today with a dog bite that happened a few days ago. Dog was her neighbors and she was trying to remove it from trying to attack another dog and child. Dog bite on hand.     Review of Systems   Constitutional:  Negative for chills and fever.   HENT:  Negative for ear pain and sore throat.    Eyes:  Negative for pain and visual disturbance.   Respiratory:  Negative for cough and shortness of breath.    Cardiovascular:  Negative for chest pain and palpitations.   Gastrointestinal:  Negative for abdominal pain and vomiting.   Genitourinary:  Negative for dysuria and hematuria.   Musculoskeletal:  Negative for arthralgias and back pain.   Skin:  Negative for color change and rash.   Neurological:  Negative for seizures and syncope.   All other systems reviewed and are negative.      Objective     /87   Pulse 85   Temp 98.7 °F (37.1 °C)   Resp 18   Ht 4' 8\" (1.422 m)   Wt 58.1 kg (128 lb)   SpO2 100%   BMI 28.70 kg/m²     Physical Exam  Vitals and nursing note reviewed.   Constitutional:       General: She is not in acute distress.     Appearance: She is well-developed.   HENT:      Head: Normocephalic and atraumatic.   Eyes:      Conjunctiva/sclera: Conjunctivae normal.   Musculoskeletal:         General: No swelling.      Right hand: " Laceration and tenderness present. Decreased range of motion. Normal strength. Decreased sensation of the radial distribution. Normal pulse.      Cervical back: Neck supple.      Comments: Puncture wound on dorsum of hand near first phalangeal. Blood oozing from wound.     Puncture of first phalangeal and second phalangeal digital web which is crusted over.     Puncture wound present at the distal phalanx of first phalangeal.     Skin:     General: Skin is warm and dry.      Capillary Refill: Capillary refill takes less than 2 seconds.   Neurological:      Mental Status: She is alert.   Psychiatric:         Mood and Affect: Mood normal.                No

## 2025-02-06 ENCOUNTER — OFFICE VISIT (OUTPATIENT)
Dept: GYNECOLOGY | Facility: CLINIC | Age: 60
End: 2025-02-06
Payer: COMMERCIAL

## 2025-02-06 VITALS
WEIGHT: 125 LBS | HEIGHT: 57 IN | DIASTOLIC BLOOD PRESSURE: 78 MMHG | SYSTOLIC BLOOD PRESSURE: 132 MMHG | BODY MASS INDEX: 26.97 KG/M2

## 2025-02-06 DIAGNOSIS — Z01.419 ENCOUNTER FOR ANNUAL ROUTINE GYNECOLOGICAL EXAMINATION: Primary | ICD-10-CM

## 2025-02-06 DIAGNOSIS — Z12.31 ENCOUNTER FOR SCREENING MAMMOGRAM FOR BREAST CANCER: ICD-10-CM

## 2025-02-06 PROCEDURE — 99396 PREV VISIT EST AGE 40-64: CPT | Performed by: OBSTETRICS & GYNECOLOGY

## 2025-02-06 RX ORDER — LISINOPRIL 20 MG/1
TABLET ORAL
COMMUNITY
Start: 2025-01-28

## 2025-02-06 RX ORDER — HYDROCHLOROTHIAZIDE 12.5 MG/1
1 TABLET ORAL DAILY
COMMUNITY
Start: 2025-02-03

## 2025-02-06 RX ORDER — LINACLOTIDE 290 UG/1
CAPSULE, GELATIN COATED ORAL
COMMUNITY
Start: 2005-04-30

## 2025-02-06 NOTE — PROGRESS NOTES
Name: Adriana Singh      : 1965      MRN: [id number]  Encounter Provider: Doreen Niño DO  Encounter Date: 2025   Encounter department: [facility]  :  Assessment & Plan    pap is up to date    mammogram reviewed with her including breast density.  RX given for next year,   Discussed self breast exams    colon cancer screening - colonoscopy in , recall in 10 years    Vaginal dryness - she was given a list of lubricants and moistruizers, briefly discuss vaginal estrogen if needed    discussed preventive care, regular exercise and a healthy diet      History of Present Illness   HPI  Adriana Singh is a 59 y.o. female who presents for yearly.  She is having vaginal dryness    Normal pap, negative HPV in   Normal 3D mammogram last Feb with scattered fibroglandular densities and intermediate risk.  She has this ordered and scheduled for later this month        Review of Systems   Constitutional: Negative.    Gastrointestinal: Negative.    Genitourinary: Negative.           Objective   There were no vitals taken for this visit.     Physical Exam  Vitals and nursing note reviewed. Exam conducted with a chaperone present.   Constitutional:       Appearance: She is well-developed.   HENT:      Head: Normocephalic and atraumatic.   Neck:      Thyroid: No thyromegaly.   Cardiovascular:      Rate and Rhythm: Normal rate and regular rhythm.   Pulmonary:      Effort: Pulmonary effort is normal.      Breath sounds: Normal breath sounds.   Chest:   Breasts:     Right: Normal.      Left: Normal.      Comments: Examined seated and supine  Abdominal:      Palpations: Abdomen is soft.   Genitourinary:     General: Normal vulva.      Vagina: Normal.      Cervix: Normal.      Uterus: Normal.       Adnexa: Right adnexa normal and left adnexa normal.      Rectum: Normal.   Musculoskeletal:      Cervical back: Neck supple.   Skin:     General: Skin is warm and dry.   Neurological:      Mental  Status: She is alert.   Psychiatric:         Mood and Affect: Mood normal.

## 2025-02-27 ENCOUNTER — HOSPITAL ENCOUNTER (OUTPATIENT)
Dept: RADIOLOGY | Age: 60
Discharge: HOME | End: 2025-02-27
Payer: COMMERCIAL

## 2025-02-27 VITALS — HEIGHT: 57 IN | BODY MASS INDEX: 26.54 KG/M2 | WEIGHT: 123 LBS

## 2025-02-27 DIAGNOSIS — Z12.31 ENCOUNTER FOR SCREENING MAMMOGRAM FOR BREAST CANCER: ICD-10-CM

## 2025-02-27 PROCEDURE — 77063 BREAST TOMOSYNTHESIS BI: CPT

## 2025-02-27 PROCEDURE — 77067 SCR MAMMO BI INCL CAD: CPT

## 2025-06-30 ENCOUNTER — NURSE TRIAGE (OUTPATIENT)
Age: 60
End: 2025-06-30

## 2025-06-30 DIAGNOSIS — R30.0 BURNING WITH URINATION: Primary | ICD-10-CM

## 2025-06-30 NOTE — TELEPHONE ENCOUNTER
Pt calling in saying that she needs to go to [facility] for UA/Urine culture. Pt is unable to print the lab order from her mychart. Pt is notified to contact the [facility] will be going to, and call back OBGYN with the fax number, as the orders will then be faxed. Pt verbalized understanding.

## 2025-06-30 NOTE — TELEPHONE ENCOUNTER
"REASON FOR CONVERSATION: Urinary Symptoms    SYMPTOMS: burning with urination, urinary frequency, urgency , little urination    OTHER HEALTH INFORMATION: Patient called office complaining of burning with urination, urinary frequency, urgency, little urination coming out when urinating and pain radiating to chest when she releases urine.  She denies abdominal pain, back pain, fever, blood in urine. She states she just finished antibiotics last Sunday for UTI. She was on Cipro for 7 days and Keflex for 7 days. Patient states her PCP is not in the office today. Ordered urine labs and advised she have them done today as soon as possible.     PROTOCOL DISPOSITION: Order Lab Work (overriding See Within 2 Weeks in Office)    CARE ADVICE PROVIDED: Have labs done as soon as possible. Hydrate. Call with new or worsening symptoms. Call with fever, abdominal pain, back pain, blood in urine.     PRACTICE FOLLOW-UP: Routing to provider for further advice if needed      Reason for Disposition   All other urine symptoms    Answer Assessment - Initial Assessment Questions  1. SYMPTOM: \"What's the main symptom you're concerned about?\" (e.g., frequency, incontinence)      Burning with urination, frequency, urgency  2. ONSET: \"When did the  symptoms   start?\"      This morning   3. PAIN: \"Is there any pain?\" If Yes, ask: \"How bad is it?\" (Scale: 1-10; mild, moderate, severe)      7  4. CAUSE: \"What do you think is causing the symptoms?\"      UTI   5. OTHER SYMPTOMS: \"Do you have any other symptoms?\" (e.g., blood in urine, fever, flank pain, pain with urination)      Little urine comes out, pain radiates into chest when releases urine    Protocols used: Urinary Symptoms-Adult-OH  Please order Urinalysis and C&S.    "

## 2025-07-03 NOTE — TELEPHONE ENCOUNTER
Incoming call from patient, states that she has seen her urine results from [facility] and they are negative for UTI. However, patient is still having increased urgency, frequency and burning with urination. States that she is having slight improvement in symptoms due to taking aleve and motrin. Patient would like recommendations on what to do next.

## 2025-07-03 NOTE — TELEPHONE ENCOUNTER
Return call made to patient, she has an appointment scheduled.  No further questions or concerns at this time.

## 2025-07-11 ENCOUNTER — OFFICE VISIT (OUTPATIENT)
Dept: GYNECOLOGY | Facility: CLINIC | Age: 60
End: 2025-07-11
Payer: COMMERCIAL

## 2025-07-11 DIAGNOSIS — R35.0 FREQUENCY OF URINATION: ICD-10-CM

## 2025-07-11 DIAGNOSIS — R39.15 URGENCY OF URINATION: ICD-10-CM

## 2025-07-11 DIAGNOSIS — N95.2 VAGINAL ATROPHY: Primary | ICD-10-CM

## 2025-07-11 PROCEDURE — 99213 OFFICE O/P EST LOW 20 MIN: CPT | Performed by: OBSTETRICS & GYNECOLOGY

## 2025-07-11 RX ORDER — ESTRADIOL 0.1 MG/G
1 CREAM VAGINAL 2 TIMES WEEKLY
Qty: 42.5 G | Refills: 2 | Status: SHIPPED | OUTPATIENT
Start: 2025-07-14

## 2025-07-11 NOTE — PROGRESS NOTES
Name: Adriana Singh      : 1965      MRN: [id number]  Encounter Provider: Doreen Niño DO  Encounter Date: 2025   Encounter department: [facility]  :  Assessment & Plan  Vaginal atrophy    Orders:    estradiol (ESTRACE VAGINAL) 0.1 mg/g vaginal cream; Insert 1 g into the vagina 2 (two) times a week At bedtime    Urgency of urination         Frequency of urination         Dysuria - Urinary frequency and urgency -her exam is normal with the exception of vaginal atrophy.  I believe this could be partially causing her symptoms.  We have discussed using vaginal estrogen in the past for dryness and dyspareunia.  We also discussed overactive bladder.  I gave her information on bladder training and avoiding bladder irritants.        I explained that I could not diagnose urethral stricture on exam however since she had this in the past, albeit several years ago, she may need to see urology again if her symptoms do not resolve.    Since the initial UTI, although her symptoms have persisted, they have been  slowly improving.      She will work on the bladder training and the vaginal estrogen.  She is aware the vaginal estrogen will take several weeks for significant result.  If her symptoms do not completely resolve, she will talk to her family doctor about other options including urology consult.    History of Present Illness   HPI  Adriana Singh is a 60 y.o. female who presents for evaluation of urinary concerns.  She was having dysuria, urgency and frequency.  This started on .  She also had blood in her urine.  [facility] and [facility] and started her on Cipro.  She did not receive the result of the culture but she did feel better after taking the Cipro and she took it for 7 days.  1 week later, her symptoms started again.  She had a repeat culture through her family doctor which was negative but she had already been started on Keflex.  She took the Keflex for  the full week even though the culture was negative because she felt better while taking Keflex.  Again, 1 week later, her symptoms recurred.  They are not as severe as the initial episode and she has not had blood in her urine.  Her most recent culture on June 30 was normal.     now, her symptoms are frequency, burning and occasional urgency    She did have a urethral stricture at age 10 that was dilated.    She has had vaginal dryness and dyspareunia in the past and we have discussed starting vaginal estrogen in addition to lubricant and moisturizer.              Review of Systems   Constitutional: Negative.    Gastrointestinal: Negative.    Genitourinary:  Positive for dysuria, frequency and urgency.          Objective   There were no vitals taken for this visit.     Physical Exam  Genitourinary:     General: Normal vulva.      Vagina: Normal.      Cervix: Normal.      Uterus: Normal.       Adnexa: Right adnexa normal and left adnexa normal.      Comments: Atrophic changes